# Patient Record
(demographics unavailable — no encounter records)

---

## 2024-12-26 NOTE — PHYSICAL EXAM
[Healthy Appearing] : healthy appearing [Normal Appearance] : normal appearance [Well formed] : well formed [Normal S1 and S2] : normal S1 and S2 [Clear to Ausculation Bilaterally] : clear to auscultation bilaterally [Abdomen Soft] : soft [1] : was Manuel stage 1 [Manuel Stage ___] : the Manuel stage for breast development was [unfilled] [Normal] : grossly intact

## 2024-12-27 NOTE — HISTORY OF PRESENT ILLNESS
[Premenarchal] : premenarchal [Headaches] : no headaches [Fatigue] : no fatigue [Abdominal Pain] : no abdominal pain [FreeTextEntry2] : Shireen is a 9 year 9 month old girl, referred for evaluation of her growth. Review of her growth chart shows her height percentile was always below the curve, but it seems like it is decelerating overtime. Her weight chart shows a similar pattern. Her BMI declined as well over the years.   Her mother reports she has a good appetite, but she is a peaky eater. Her diet in a typical day:  Breakfast- smoothie- strawberries, banana, lunch- salami sandwich/Quesadilla, dinner- chicken and rice. Snacks- goldfish, Cheez-it, chips, apples. Sugary drinks- juice box for lunch. She takes a multivitamin.

## 2024-12-27 NOTE — CONSULT LETTER
[Dear  ___] : Dear  [unfilled], [Consult Letter:] : I had the pleasure of evaluating your patient, [unfilled]. [Please see my note below.] : Please see my note below. [Consult Closing:] : Thank you very much for allowing me to participate in the care of this patient.  If you have any questions, please do not hesitate to contact me. [Sincerely,] : Sincerely, [FreeTextEntry3] : Iman Ortez MD [facility] Division of Pediatric Endocrinology  [facility] at [facility]

## 2024-12-27 NOTE — FAMILY HISTORY
[___ inches] : [unfilled] inches [FreeTextEntry5] : 10YO [FreeTextEntry4] : MGM 59", MGF 70", PGM 61" [FreeTextEntry2] : 11YO brother

## 2024-12-27 NOTE — CONSULT LETTER
[Dear  ___] : Dear  [unfilled], [Consult Letter:] : I had the pleasure of evaluating your patient, [unfilled]. [Please see my note below.] : Please see my note below. [Consult Closing:] : Thank you very much for allowing me to participate in the care of this patient.  If you have any questions, please do not hesitate to contact me. [Sincerely,] : Sincerely, [FreeTextEntry3] : Iman Ortez MD [facility] Division of Pediatric Endocrinology  [facility] at [facility] [place]

## 2024-12-27 NOTE — PAST MEDICAL HISTORY
[At Term] : at term [Normal Vaginal Route] : by normal vaginal route [None] : there were no delivery complications [Age Appropriate] : age appropriate developmental milestones met [FreeTextEntry1] : 7 pounds 7/9 ounces

## 2024-12-27 NOTE — CONSULT LETTER
[Dear  ___] : Dear  [unfilled], [Consult Letter:] : I had the pleasure of evaluating your patient, [unfilled]. [Please see my note below.] : Please see my note below. [Consult Closing:] : Thank you very much for allowing me to participate in the care of this patient.  If you have any questions, please do not hesitate to contact me. [Sincerely,] : Sincerely, [FreeTextEntry3] : Iman Ortez MD [facility] Division of Pediatric Endocrinology  [facility] at [facility]

## 2024-12-27 NOTE — ADDENDUM
[FreeTextEntry1] : Bone age is delayed, it was read by radiology as 6 years 10 months, I think it is closer to 7 year 10 months. HP 57 inches.

## 2024-12-27 NOTE — FAMILY HISTORY
[___ inches] : [unfilled] inches [FreeTextEntry5] : 12YO [FreeTextEntry4] : MGM 59", MGF 70", PGM 61" [FreeTextEntry2] : 13YO brother

## 2024-12-27 NOTE — ASSESSMENT
[FreeTextEntry1] :  Shireen is a 9 year 9-month-old girl, referred for evaluation of her growth. Today she is at the 1st percentile for height and weight, 4th percentile for BMI. On physical exam she is prepubertal. I discussed with Shireen and her mother and explained that in order to optimize her growth, the caloric intake should be sufficient. I explained that her height and weight percentiles are declining and that I would like to order screening blood work to evaluate for different conditions that can cause growth failure. I also ordered a bone age study. I will call them to discuss the results. I would like to see her again in 4 month and evaluate her growth velocity. Based on that we will decide about the next step.

## 2025-04-09 NOTE — DISCUSSION/SUMMARY
[FreeTextEntry1] : Shireen is a 10 year-1 -month-old girl, referred for evaluation of her growth. Today she is at the 1st percentile for height and weight, 9th percentile for BMI. On physical exam she is prepubertal. Labs completed after previous visit had mildly elevated ESR and delayed bone age. She has improved caloric intake per mom with improved BMI and growth velocity is 5.12 cm/yr, which is appropriate for prepubertal age. Growth is likely improving with improved caloric intake. Will follow up in 4 months to monitor growth.

## 2025-04-14 NOTE — HISTORY OF PRESENT ILLNESS
[Premenarchal] : premenarchal [Headaches] : no headaches [Visual Symptoms] : no ~T visual symptoms [Polyuria] : no polyuria [Polydipsia] : no polydipsia [Knee Pain] : no knee pain [Hip Pain] : no hip pain [Personality Changes] : ~T no personality changes [Constipation] : no constipation [Fatigue] : no fatigue [Weakness] : no weakness [Weight Loss] : no weight loss [Nausea] : no nausea [Vomiting] : no vomiting [FreeTextEntry2] : Malia is a 10-year 1 month old girl follows here for her growth.   She was initially seen in 12/2024. Review of her growth chart showed her height percentile was always below the curve, but it seems like it is decelerating overtime. Her weight chart shows a similar pattern. Her BMI declined as well over the years. Her mother reported she has a good appetite, but she is a peaky eater. At the visit, she was at the 1st percentile for height and weight, 4th percentile for BMI. On physical exam she was prepubertal. She had blood work done after the visit that showed ESR slightly elevated, the rest is normal. Ca is flagged as elevated but when corrected to albumin it is normal. Bone age study was delayed, it was read by radiology as 6 years 10 months, I think it is closer to 7 year 10 months. HP 57 inches.    Mother's height: 60 inches. Father's height: 65.5 inches. MALIA's potential height measures at 60.25 inches.   Mother's Menarche: 10YO    Since her previous visit, she has meet with a nutritionist and has worked on increasing calorie intake, is less picky with diet, which has helped with her energy, attention, and weight gain. She has been otherwise well. Takes multivitamin intermittently.

## 2025-04-14 NOTE — ASSESSMENT
[FreeTextEntry1] :  Shireen is a 10 year-1 -month-old girl, referred for evaluation of her growth. Today she is at the 1st percentile for height and weight, 9th percentile for BMI. On physical exam she is prepubertal. Labs completed after previous visit had mildly elevated ESR and delayed bone age. She has improved caloric intake per mom with improved BMI and growth velocity is 5.12 cm/yr, which is appropriate for prepubertal status. Growth is likely improving with improved caloric intake. Will follow up in 4 months to monitor growth velocity.

## 2025-04-14 NOTE — PHYSICAL EXAM
[Healthy Appearing] : healthy appearing [Well Nourished] : well nourished [Interactive] : interactive [Looks Younger than Stated Years] : looks younger than stated years [Normal Appearance] : normal appearance [Well formed] : well formed [Normally Set] : normally set [Normal S1 and S2] : normal S1 and S2 [Clear to Ausculation Bilaterally] : clear to auscultation bilaterally [Abdomen Soft] : soft [Abdomen Tenderness] : non-tender [] : no hepatosplenomegaly [Normal] : normal  [1] : was Manuel stage 1 [Murmur] : no murmurs

## 2025-04-14 NOTE — CONSULT LETTER
[FreeTextEntry3] : Iman Ortez MD [facility] Division of Pediatric Endocrinology  [facility]

## 2025-04-14 NOTE — CONSULT LETTER
[FreeTextEntry3] : Iman Ortez MD [facility] Division of Pediatric Endocrinology  [facility] at \[facility]""/[facility]

## 2025-04-14 NOTE — HISTORY OF PRESENT ILLNESS
[Premenarchal] : premenarchal [Headaches] : no headaches [Visual Symptoms] : no ~T visual symptoms [Polyuria] : no polyuria [Polydipsia] : no polydipsia [Knee Pain] : no knee pain [Hip Pain] : no hip pain [Personality Changes] : ~T no personality changes [Constipation] : no constipation [Fatigue] : no fatigue [Weakness] : no weakness [Weight Loss] : no weight loss [Nausea] : no nausea [Vomiting] : no vomiting [FreeTextEntry2] : Malia is a 10-year 1 month old girl follows here for her growth.   She was initially seen in 12/2024. Review of her growth chart showed her height percentile was always below the curve, but it seems like it is decelerating overtime. Her weight chart shows a similar pattern. Her BMI declined as well over the years. Her mother reported she has a good appetite, but she is a peaky eater. At the visit, she was at the 1st percentile for height and weight, 4th percentile for BMI. On physical exam she was prepubertal. She had blood work done after the visit that showed ESR slightly elevated, the rest is normal. Ca is flagged as elevated but when corrected to albumin it is normal. Bone age study was delayed, it was read by radiology as 6 years 10 months, I think it is closer to 7 year 10 months. HP 57 inches.    Mother's height: 60 inches. Father's height: 65.5 inches. MALIA's potential height measures at 60.25 inches.   Mother's Menarche: 12YO    Since her previous visit, she has meet with a nutritionist and has worked on increasing calorie intake, is less picky with diet, which has helped with her energy, attention, and weight gain. She has been otherwise well. Takes multivitamin intermittently.

## 2025-04-14 NOTE — END OF VISIT
[] : Resident [FreeTextEntry3] : I discussed this patient in a pre-clinic session with the resident including review of clinical status and last labs.  I also saw the patient and discussed history, completed an exam and discussed the plan together with the resident.